# Patient Record
Sex: FEMALE | Race: WHITE | NOT HISPANIC OR LATINO | Employment: UNEMPLOYED
[De-identification: names, ages, dates, MRNs, and addresses within clinical notes are randomized per-mention and may not be internally consistent; named-entity substitution may affect disease eponyms.]

---

## 2018-07-28 ENCOUNTER — HEALTH MAINTENANCE LETTER (OUTPATIENT)
Age: 53
End: 2018-07-28

## 2019-09-11 ENCOUNTER — TELEPHONE (OUTPATIENT)
Dept: OBGYN | Facility: CLINIC | Age: 54
End: 2019-09-11

## 2019-09-11 DIAGNOSIS — Z13.820 SPECIAL SCREENING FOR OSTEOPOROSIS: Primary | ICD-10-CM

## 2019-09-11 DIAGNOSIS — Z12.31 ENCOUNTER FOR SCREENING MAMMOGRAM FOR MALIGNANT NEOPLASM OF BREAST: ICD-10-CM

## 2019-09-11 NOTE — TELEPHONE ENCOUNTER
Reason for call:  Order   Order or referral being requested: mammogram and dexa  Reason for request: orders in her chart have   Date needed: before my next appointment  Has the patient been seen by the PCP for this problem? Not Applicable    Additional comments: NA    Phone number to reach patient:  Cell number on file:    Telephone Information:   Mobile [phone]       Best Time:  NA    Can we leave a detailed message on this number?  Not Applicable     Gloria REYES  Central Scheduler

## 2019-09-29 ENCOUNTER — HEALTH MAINTENANCE LETTER (OUTPATIENT)
Age: 54
End: 2019-09-29

## 2021-01-14 ENCOUNTER — HEALTH MAINTENANCE LETTER (OUTPATIENT)
Age: 56
End: 2021-01-14

## 2021-10-24 ENCOUNTER — HEALTH MAINTENANCE LETTER (OUTPATIENT)
Age: 56
End: 2021-10-24

## 2021-12-09 NOTE — PROGRESS NOTES
Indu is a 56 year old  female who presents for annual exam.     Besides routine health maintenance, she has no other health concerns today .    HPI:  The patient's PCP is Van Maravilla MD. Patient here today for her annual GYN exam and Pap smear.she is also having her mammogram today.  She is postmenopausal and has no GYN complaints at this time.  She is due for DEXA scan.  She has been under a considerable amount of stress with a 93-year-old mother who recently had a massive stroke.    Her blood pressure was very high today and she denies any vision changes or headaches.  She does have a family history on her father side of hypertension.  She has been trying to control this with diet and exercise over the last couple of years but has been unsuccessful.  She does have a blood pressure cuff at home.      GYNECOLOGIC HISTORY:    No LMP recorded. Patient is postmenopausal.    Her current contraception method is: menopause.  She  reports that she quit smoking about 26 years ago. Her smoking use included cigarettes. She has never used smokeless tobacco.    Patient is sexually active.  STD testing offered?  Declined  Last PHQ-9 score on record =   PHQ-9 SCORE 12/10/2021   PHQ-9 Total Score 1     Last GAD7 score on record =   NATHANIEL-7 SCORE 12/10/2021   Total Score 2     Alcohol Score = 2    HEALTH MAINTENANCE:  Cholesterol:   Recent Labs   Lab Test 14  0000   CHOL 226*   HDL 61   *   TRIG 121   Last Mammo: 2015, Result: Normal, Next Mammo: Today  Pap:   Lab Results   Component Value Date    PAP NIL 2015     Colonoscopy:  2014, Result: Normal, Next Colonoscopy: Will schedule when able.  Dexa:  N/a    Health maintenance updated:  yes    HISTORY:  OB History    Para Term  AB Living   2 2 0 0 0 2   SAB IAB Ectopic Multiple Live Births   0 0 0 0 0      # Outcome Date GA Lbr Patrick/2nd Weight Sex Delivery Anes PTL Lv   2 Para      Vag-Spont      1 Para      Vag-Spont           Patient Active Problem List   Diagnosis     Postmenopausal status     Situational anxiety     Past Surgical History:   Procedure Laterality Date     COLONOSCOPY  2014    Procedure: COLONOSCOPY;  COLONOSCOPY;  Surgeon: Jenni Costa MD;  Location:  GI     exploratory laproscopic surgery[      for fertility reasons, hx of endometriosis     TONSILLECTOMY      age 16, nausea and vomitin post procedure      Social History     Tobacco Use     Smoking status: Former Smoker     Types: Cigarettes     Quit date: 1995     Years since quittin.9     Smokeless tobacco: Never Used   Substance Use Topics     Alcohol use: Yes     Alcohol/week: 4.2 standard drinks     Types: 5 Standard drinks or equivalent per week     Comment: socially      Problem (# of Occurrences) Relation (Name,Age of Onset)    Cancer - colorectal (4) Mother, Maternal Grandmother: , Other (Great Aunt), Other (Great Uncle)    Cerebrovascular Disease (3) Mother, Father, Paternal Grandfather    Coronary Artery Disease (4) Father, Maternal Grandfather, Paternal Grandmother, Paternal Grandfather    Diabetes (1) Maternal Grandmother    Hypertension (2) Mother, Father    Other Cancer (1) Paternal Grandmother    Thyroid Disease (1) Paternal Grandfather            Current Outpatient Medications   Medication Sig     ASPIRIN PO Take 81 mg by mouth     multivitamin, therapeutic with minerals (MULTI-VITAMIN) TABS Take 1 tablet by mouth daily     Omega-3 Fatty Acids (OMEGA-3 FISH OIL PO) Take 1 g by mouth daily     LORazepam (ATIVAN) 0.5 MG tablet Take one half to one tablet by mouth every 8 hours as needed (Patient not taking: Reported on 12/10/2021)     No current facility-administered medications for this visit.     No Known Allergies    Past medical, surgical, social and family histories were reviewed and updated in EPIC.    ROS:   12 point review of systems negative other than symptoms noted below or in the HPI.  No urinary frequency  "or dysuria, bladder or kidney problems    EXAM:  BP (!) 190/110   Pulse 86   Ht 1.581 m (5' 2.25\")   Wt 79.5 kg (175 lb 3.2 oz)   Breastfeeding No   BMI 31.79 kg/m     BMI: Body mass index is 31.79 kg/m .    PHYSICAL EXAM:  Constitutional:   Appearance: Well nourished, well developed, alert, in no acute distress  Neck:  Lymph Nodes:  No lymphadenopathy present    Thyroid:  Gland size normal, nontender, no nodules or masses present  on palpation  Chest:  Respiratory Effort:  Breathing unlabored  Cardiovascular:    Heart: Auscultation:  Regular rate, normal rhythm, no murmurs present  Breasts: Inspection of Breasts:  No lymphadenopathy present., Palpation of Breasts and Axillae:  No masses present on palpation, no breast tenderness., Axillary Lymph Nodes:  No lymphadenopathy present., No nodularity, asymmetry or nipple discharge bilaterally. and Extremely dense bilaterally throughout  Gastrointestinal:   Abdominal Examination:  Abdomen nontender to palpation, tone normal without rigidity or guarding, no masses present, umbilicus without lesions   Liver and Spleen:  No hepatomegaly present, liver nontender to palpation    Hernias:  No hernias present  Lymphatic: Lymph Nodes:  No other lymphadenopathy present  Skin:  General Inspection:  No rashes present, no lesions present, no areas of  discoloration  Neurologic:    Mental Status:  Oriented X3.  Normal strength and tone, sensory exam                grossly normal, mentation intact and speech normal.    Psychiatric:   Mentation appears normal and affect normal/bright.         Pelvic Exam:  External Genitalia:     Normal appearance for age, no discharge present, no tenderness present, no inflammatory lesions present, color normal  Vagina:     Normal vaginal vault without central or paravaginal defects, no discharge present, no inflammatory lesions present, no masses present  Bladder:     Nontender to palpation  Urethra:   Urethral Body:  Urethra palpation normal, " urethra structural support normal   Urethral Meatus:  No erythema or lesions present  Cervix:     Appearance healthy, no lesions present, nontender to palpation, no bleeding present  Uterus:     Uterus: firm, normal sized and nontender, midplane in position.   Adnexa:     No adnexal tenderness present, no adnexal masses present  Perineum:     Perineum within normal limits, no evidence of trauma, no rashes or skin lesions present  Anus:     Anus within normal limits, no hemorrhoids present  Inguinal Lymph Nodes:     No lymphadenopathy present  Pubic Hair:     Normal pubic hair distribution for age  Genitalia and Groin:     No rashes present, no lesions present, no areas of discoloration, no masses present      COUNSELING:   Special attention given to:        Regular exercise       Healthy diet/nutrition       (Frieda)menopause management    BMI: Body mass index is 31.79 kg/m .  Weight management plan: Discussed healthy diet and exercise guidelines    ASSESSMENT:  56 year old female with satisfactory annual exam.    ICD-10-CM    1. Encounter for gynecological examination without abnormal finding  Z01.419 Pap thin layer screen with HPV - recommended age 30 - 65 years   2. Screening for osteoporosis  Z13.820 DX Hip/Pelvis/Spine   3. Screening for metabolic disorder  Z13.228 Comprehensive metabolic panel (BMP + Alb, Alk Phos, ALT, AST, Total. Bili, TP)   4. Hypertension, essential  I10        PLAN:  56-year-old female with a normal postmenopausal GYN exam.  She does have elevated blood pressure today and we have asked her to check with her at home blood pressure a few times next week and report her findings to us.  We also discussed the DASH diet.  If her labs are normal and her blood pressure remains high at home we will start a low-dose antihypertensive.    MARLEY Cruz CNP

## 2021-12-10 ENCOUNTER — ANCILLARY PROCEDURE (OUTPATIENT)
Dept: MAMMOGRAPHY | Facility: CLINIC | Age: 56
End: 2021-12-10
Attending: OBSTETRICS & GYNECOLOGY
Payer: COMMERCIAL

## 2021-12-10 ENCOUNTER — OFFICE VISIT (OUTPATIENT)
Dept: OBGYN | Facility: CLINIC | Age: 56
End: 2021-12-10
Payer: COMMERCIAL

## 2021-12-10 VITALS
SYSTOLIC BLOOD PRESSURE: 190 MMHG | DIASTOLIC BLOOD PRESSURE: 110 MMHG | WEIGHT: 175.2 LBS | BODY MASS INDEX: 32.24 KG/M2 | HEART RATE: 86 BPM | HEIGHT: 62 IN

## 2021-12-10 DIAGNOSIS — Z13.228 SCREENING FOR METABOLIC DISORDER: ICD-10-CM

## 2021-12-10 DIAGNOSIS — Z13.820 SCREENING FOR OSTEOPOROSIS: ICD-10-CM

## 2021-12-10 DIAGNOSIS — Z12.31 VISIT FOR SCREENING MAMMOGRAM: ICD-10-CM

## 2021-12-10 DIAGNOSIS — Z01.419 ENCOUNTER FOR GYNECOLOGICAL EXAMINATION WITHOUT ABNORMAL FINDING: Primary | ICD-10-CM

## 2021-12-10 DIAGNOSIS — I10 HYPERTENSION, ESSENTIAL: ICD-10-CM

## 2021-12-10 PROCEDURE — 77067 SCR MAMMO BI INCL CAD: CPT | Mod: TC | Performed by: RADIOLOGY

## 2021-12-10 PROCEDURE — 99386 PREV VISIT NEW AGE 40-64: CPT | Performed by: NURSE PRACTITIONER

## 2021-12-10 PROCEDURE — 80053 COMPREHEN METABOLIC PANEL: CPT | Performed by: NURSE PRACTITIONER

## 2021-12-10 PROCEDURE — 87624 HPV HI-RISK TYP POOLED RSLT: CPT | Performed by: NURSE PRACTITIONER

## 2021-12-10 PROCEDURE — G0145 SCR C/V CYTO,THINLAYER,RESCR: HCPCS | Performed by: NURSE PRACTITIONER

## 2021-12-10 PROCEDURE — 36415 COLL VENOUS BLD VENIPUNCTURE: CPT | Performed by: NURSE PRACTITIONER

## 2021-12-10 ASSESSMENT — ANXIETY QUESTIONNAIRES
IF YOU CHECKED OFF ANY PROBLEMS ON THIS QUESTIONNAIRE, HOW DIFFICULT HAVE THESE PROBLEMS MADE IT FOR YOU TO DO YOUR WORK, TAKE CARE OF THINGS AT HOME, OR GET ALONG WITH OTHER PEOPLE: SOMEWHAT DIFFICULT
1. FEELING NERVOUS, ANXIOUS, OR ON EDGE: SEVERAL DAYS
3. WORRYING TOO MUCH ABOUT DIFFERENT THINGS: NOT AT ALL
GAD7 TOTAL SCORE: 2
5. BEING SO RESTLESS THAT IT IS HARD TO SIT STILL: NOT AT ALL
2. NOT BEING ABLE TO STOP OR CONTROL WORRYING: NOT AT ALL
6. BECOMING EASILY ANNOYED OR IRRITABLE: SEVERAL DAYS
7. FEELING AFRAID AS IF SOMETHING AWFUL MIGHT HAPPEN: NOT AT ALL

## 2021-12-10 ASSESSMENT — PATIENT HEALTH QUESTIONNAIRE - PHQ9
5. POOR APPETITE OR OVEREATING: NOT AT ALL
SUM OF ALL RESPONSES TO PHQ QUESTIONS 1-9: 1

## 2021-12-10 ASSESSMENT — MIFFLIN-ST. JEOR: SCORE: 1331.92

## 2021-12-11 ASSESSMENT — ANXIETY QUESTIONNAIRES: GAD7 TOTAL SCORE: 2

## 2021-12-12 LAB
ALBUMIN SERPL-MCNC: 4.4 G/DL (ref 3.4–5)
ALP SERPL-CCNC: 72 U/L (ref 40–150)
ALT SERPL W P-5'-P-CCNC: 47 U/L (ref 0–50)
ANION GAP SERPL CALCULATED.3IONS-SCNC: 5 MMOL/L (ref 3–14)
AST SERPL W P-5'-P-CCNC: 25 U/L (ref 0–45)
BILIRUB SERPL-MCNC: 0.4 MG/DL (ref 0.2–1.3)
BUN SERPL-MCNC: 23 MG/DL (ref 7–30)
CALCIUM SERPL-MCNC: 9.9 MG/DL (ref 8.5–10.1)
CHLORIDE BLD-SCNC: 104 MMOL/L (ref 94–109)
CO2 SERPL-SCNC: 29 MMOL/L (ref 20–32)
CREAT SERPL-MCNC: 0.63 MG/DL (ref 0.52–1.04)
GFR SERPL CREATININE-BSD FRML MDRD: >90 ML/MIN/1.73M2
GLUCOSE BLD-MCNC: 82 MG/DL (ref 70–99)
POTASSIUM BLD-SCNC: 4.5 MMOL/L (ref 3.4–5.3)
PROT SERPL-MCNC: 8.1 G/DL (ref 6.8–8.8)
SODIUM SERPL-SCNC: 138 MMOL/L (ref 133–144)

## 2021-12-15 LAB
BKR LAB AP GYN ADEQUACY: NORMAL
BKR LAB AP GYN INTERPRETATION: NORMAL
BKR LAB AP HPV REFLEX: NORMAL
BKR LAB AP PREVIOUS ABNORMAL: NORMAL
PATH REPORT.COMMENTS IMP SPEC: NORMAL
PATH REPORT.COMMENTS IMP SPEC: NORMAL
PATH REPORT.RELEVANT HX SPEC: NORMAL

## 2021-12-17 LAB
HUMAN PAPILLOMA VIRUS 16 DNA: NEGATIVE
HUMAN PAPILLOMA VIRUS 18 DNA: NEGATIVE
HUMAN PAPILLOMA VIRUS FINAL DIAGNOSIS: NORMAL
HUMAN PAPILLOMA VIRUS OTHER HR: NEGATIVE

## 2022-05-02 ENCOUNTER — HOSPITAL ENCOUNTER (OUTPATIENT)
Dept: BONE DENSITY | Facility: CLINIC | Age: 57
Discharge: HOME | End: 2022-05-02
Attending: NURSE PRACTITIONER | Admitting: NURSE PRACTITIONER
Payer: COMMERCIAL

## 2022-05-02 DIAGNOSIS — Z13.820 SCREENING FOR OSTEOPOROSIS: ICD-10-CM

## 2022-05-02 PROCEDURE — 77080 DXA BONE DENSITY AXIAL: CPT

## 2022-10-15 ENCOUNTER — HEALTH MAINTENANCE LETTER (OUTPATIENT)
Age: 57
End: 2022-10-15

## 2023-03-26 ENCOUNTER — HEALTH MAINTENANCE LETTER (OUTPATIENT)
Age: 58
End: 2023-03-26

## 2023-07-06 ENCOUNTER — HOSPITAL ENCOUNTER (OUTPATIENT)
Dept: MAMMOGRAPHY | Facility: CLINIC | Age: 58
Discharge: HOME | End: 2023-07-06
Attending: OBSTETRICS & GYNECOLOGY | Admitting: OBSTETRICS & GYNECOLOGY
Payer: COMMERCIAL

## 2023-07-06 DIAGNOSIS — Z12.31 VISIT FOR SCREENING MAMMOGRAM: ICD-10-CM

## 2023-07-06 PROCEDURE — 77067 SCR MAMMO BI INCL CAD: CPT

## 2023-08-29 ENCOUNTER — OFFICE VISIT (OUTPATIENT)
Dept: OBGYN | Facility: CLINIC | Age: 58
End: 2023-08-29
Payer: COMMERCIAL

## 2023-08-29 VITALS
DIASTOLIC BLOOD PRESSURE: 64 MMHG | WEIGHT: 180 LBS | BODY MASS INDEX: 31.89 KG/M2 | SYSTOLIC BLOOD PRESSURE: 120 MMHG | HEIGHT: 63 IN

## 2023-08-29 DIAGNOSIS — Z01.419 ENCNTR FOR GYN EXAM (GENERAL) (ROUTINE) W/O ABN FINDINGS: Primary | ICD-10-CM

## 2023-08-29 DIAGNOSIS — Z13.820 SPECIAL SCREENING FOR OSTEOPOROSIS: ICD-10-CM

## 2023-08-29 PROCEDURE — 99396 PREV VISIT EST AGE 40-64: CPT | Performed by: NURSE PRACTITIONER

## 2023-08-29 RX ORDER — EPINEPHRINE 0.3 MG/.3ML
0.3 INJECTION SUBCUTANEOUS
COMMUNITY
Start: 2022-07-27

## 2023-08-29 ASSESSMENT — PATIENT HEALTH QUESTIONNAIRE - PHQ9
5. POOR APPETITE OR OVEREATING: NOT AT ALL
SUM OF ALL RESPONSES TO PHQ QUESTIONS 1-9: 1

## 2023-08-29 ASSESSMENT — ANXIETY QUESTIONNAIRES
IF YOU CHECKED OFF ANY PROBLEMS ON THIS QUESTIONNAIRE, HOW DIFFICULT HAVE THESE PROBLEMS MADE IT FOR YOU TO DO YOUR WORK, TAKE CARE OF THINGS AT HOME, OR GET ALONG WITH OTHER PEOPLE: SOMEWHAT DIFFICULT
3. WORRYING TOO MUCH ABOUT DIFFERENT THINGS: SEVERAL DAYS
1. FEELING NERVOUS, ANXIOUS, OR ON EDGE: SEVERAL DAYS
GAD7 TOTAL SCORE: 4
7. FEELING AFRAID AS IF SOMETHING AWFUL MIGHT HAPPEN: SEVERAL DAYS
6. BECOMING EASILY ANNOYED OR IRRITABLE: NOT AT ALL
GAD7 TOTAL SCORE: 4
5. BEING SO RESTLESS THAT IT IS HARD TO SIT STILL: NOT AT ALL
2. NOT BEING ABLE TO STOP OR CONTROL WORRYING: SEVERAL DAYS

## 2023-08-29 NOTE — PROGRESS NOTES
Indu is a 58 year old  female who presents for annual exam.     Besides routine health maintenance, she has no other health concerns today .    HPI:  The patient's PCP is  Van Maravilla MD. patient here today for her annual well woman exam.  She is feeling well with no GYN complaints.  She is postmenopausal and has no vaginal bleeding.  She is on no hormone replacement therapy.    She sees primary care who does all of her blood work for her.  She will be due for a bone scan next year.      GYNECOLOGIC HISTORY:    No LMP recorded. Patient is postmenopausal.    Regular menses? No Patient is postmenopausal.  Menses every 0 days.  Length of menses: 0 days    Her current contraception method is: menopause.  She  reports that she quit smoking about 28 years ago. Her smoking use included cigarettes. She has never used smokeless tobacco.    Patient is sexually active.  STD testing offered?  Declined  Last PHQ-9 score on record =       2023    11:15 AM   PHQ-9 SCORE   PHQ-9 Total Score 1     Last GAD7 score on record =       2023    11:15 AM   NATHANIEL-7 SCORE   Total Score 4     Alcohol Score = 2    HEALTH MAINTENANCE:  Cholesterol   Date Value Ref Range Status   2014 226 (H) <200 mg/dL Final     Last Mammo:  2023 , Result: Normal, Next Mammo: Due 2025  Pap:   Lab Results   Component Value Date    PAP NILM, neg HPV 12/10/2021    PAP NIL, neg HPV 2015      Colonoscopy:  2014, Result: Normal, Next Colonoscopy: 10 years: 2024.       Health maintenance updated:    Care Gaps  Overdue    Never   Done ADVANCE CARE PLANNING (Every 5 Years)       Never   Done HIV SCREENING (Once)       Never   Done HEPATITIS C SCREENING (Once)       Never   Done ZOSTER IMMUNIZATION (1 of 2)       Never   Done LUNG CANCER SCREENING (Yearly)       2017 DTAP/TDAP/TD IMMUNIZATION (1 - Tdap)  Last completed:  LIPID (Every 5 Years)  Last completed: 2014     DEC 24     COVID-19 Vaccine (4 - Moderna series)  Last completed: Oct 29, 2021     DEC 10   2022 YEARLY PREVENTIVE VISIT (Yearly)  Last completed: Dec 10, 2021     MATEO 1   2023 PHQ-2 (once per calendar year) (Yearly, January to December)  Last completed: Dec 10, 2021   Due Soon    SEP 1   2023 INFLUENZA VACCINE (1)  Last completed: Oct 31, 2022   Upcoming    2024 COLORECTAL CANCER SCREENING (COLONOSCOPY - Preferred) (Every 10 Years)  Last completed:  MAMMO SCREENING (Every 2 Years)  Last completed: 2023     DEC 10   2026 HPV TEST (Once)  Last completed: Dec 10, 2021     DEC 10   2026 PAP (Every 5 Years)  Last completed: Dec 10, 2021     HISTORY:  OB History    Para Term  AB Living   2 2 0 0 0 2   SAB IAB Ectopic Multiple Live Births   0 0 0 0 0      # Outcome Date GA Lbr Patrick/2nd Weight Sex Delivery Anes PTL Lv   2 Para      Vag-Spont      1 Para      Vag-Spont          Patient Active Problem List   Diagnosis    Postmenopausal status    Situational anxiety     Past Surgical History:   Procedure Laterality Date    COLONOSCOPY  2014    Procedure: COLONOSCOPY;  COLONOSCOPY;  Surgeon: Jenni Costa MD;  Location:  GI    exploratory laproscopic surgery[      for fertility reasons, hx of endometriosis    TONSILLECTOMY      age 16, nausea and vomitin post procedure      Social History     Tobacco Use    Smoking status: Former     Types: Cigarettes     Quit date: 1995     Years since quittin.6    Smokeless tobacco: Never   Substance Use Topics    Alcohol use: Yes     Alcohol/week: 4.2 standard drinks of alcohol     Types: 5 Standard drinks or equivalent per week     Comment: socially      Problem (# of Occurrences) Relation (Name,Age of Onset)    Diabetes (1) Maternal Grandmother    Hypertension (2) Mother, Father    Cerebrovascular Disease (3) Mother, Father, Paternal Grandfather    Thyroid Disease (1) Paternal Grandfather    Cancer - colorectal (4)  "Mother, Maternal Grandmother: , Other (Great Aunt), Other (Great Uncle)    Other Cancer (1) Paternal Grandmother    Coronary Artery Disease (4) Father, Maternal Grandfather, Paternal Grandmother, Paternal Grandfather              Current Outpatient Medications   Medication Sig    ASPIRIN PO Take 81 mg by mouth    EPINEPHrine (ANY BX GENERIC EQUIV) 0.3 MG/0.3ML injection 2-pack Inject 0.3 mg into the muscle    multivitamin, therapeutic with minerals (MULTI-VITAMIN) TABS Take 1 tablet by mouth daily    Omega-3 Fatty Acids (OMEGA-3 FISH OIL PO) Take 1 g by mouth daily     No current facility-administered medications for this visit.     No Known Allergies    Past medical, surgical, social and family histories were reviewed and updated in EPIC.    ROS:   12 point review of systems negative other than symptoms noted below or in the HPI.  No urinary frequency or dysuria, bladder or kidney problems    EXAM:  /64   Ht 1.6 m (5' 3\")   Wt 81.6 kg (180 lb)   BMI 31.89 kg/m     BMI: Body mass index is 31.89 kg/m .    PHYSICAL EXAM:  Constitutional:   Appearance: Well nourished, well developed, alert, in no acute distress  Breasts: Inspection of Breasts:  No lymphadenopathy present., Palpation of Breasts and Axillae:  No masses present on palpation, no breast tenderness., Axillary Lymph Nodes:  No lymphadenopathy present., and No nodularity, asymmetry or nipple discharge bilaterally.  Skin:  General Inspection:  No rashes present, no lesions present, no areas of  discoloration  Neurologic:    Mental Status:  Oriented X3.  Normal strength and tone, sensory exam                grossly normal, mentation intact and speech normal.    Psychiatric:   Mentation appears normal and affect normal/bright.         Pelvic Exam:  External Genitalia:     Normal appearance for age, no discharge present, no tenderness present, no inflammatory lesions present, color normal  Vagina:     Normal vaginal vault without central or " paravaginal defects, no discharge present, no inflammatory lesions present, no masses present  Bladder:     Nontender to palpation  Urethra:   Urethral Body:  Urethra palpation normal, urethra structural support normal   Urethral Meatus:  No erythema or lesions present  Cervix:     Appearance healthy, no lesions present, nontender to palpation, no bleeding present  Uterus:     Uterus: firm, normal sized and nontender, anteverted in position.   Adnexa:     No adnexal tenderness present, no adnexal masses present  Perineum:     Perineum within normal limits, no evidence of trauma, no rashes or skin lesions present  Anus:     Anus within normal limits, no hemorrhoids present  Inguinal Lymph Nodes:     No lymphadenopathy present  Pubic Hair:     Normal pubic hair distribution for age  Genitalia and Groin:     No rashes present, no lesions present, no areas of discoloration, no masses present    COUNSELING:   Special attention given to:        Regular exercise       Healthy diet/nutrition       Osteoporosis prevention/bone health       Colorectal Cancer Screening    BMI: Body mass index is 31.89 kg/m .  Weight management plan: Discussed healthy diet and exercise guidelines    ASSESSMENT:  58 year old female with satisfactory annual exam.    ICD-10-CM    1. Encntr for gyn exam (general) (routine) w/o abn findings  Z01.419       2. Special screening for osteoporosis  Z13.820 DX Hip/Pelvis/Spine          PLAN:  58-year-old female with a normal GYN exam.  Colonoscopy will be due next spring.  Pap smear is up-to-date.  She is to continue with her annual mammogram.  DEXA will be due next year.    MARLEY Cruz CNP

## 2024-10-13 ENCOUNTER — HEALTH MAINTENANCE LETTER (OUTPATIENT)
Age: 59
End: 2024-10-13

## 2025-07-30 ENCOUNTER — OFFICE VISIT (OUTPATIENT)
Dept: OBGYN | Facility: CLINIC | Age: 60
End: 2025-07-30
Payer: COMMERCIAL

## 2025-07-30 ENCOUNTER — ANCILLARY PROCEDURE (OUTPATIENT)
Dept: MAMMOGRAPHY | Facility: CLINIC | Age: 60
End: 2025-07-30
Payer: COMMERCIAL

## 2025-07-30 VITALS
DIASTOLIC BLOOD PRESSURE: 76 MMHG | SYSTOLIC BLOOD PRESSURE: 134 MMHG | HEIGHT: 62 IN | WEIGHT: 180 LBS | BODY MASS INDEX: 33.13 KG/M2

## 2025-07-30 DIAGNOSIS — Z12.31 VISIT FOR SCREENING MAMMOGRAM: ICD-10-CM

## 2025-07-30 DIAGNOSIS — Z12.4 SCREENING FOR CERVICAL CANCER: ICD-10-CM

## 2025-07-30 DIAGNOSIS — Z13.820 SPECIAL SCREENING FOR OSTEOPOROSIS: ICD-10-CM

## 2025-07-30 DIAGNOSIS — Z71.84 TRAVEL ADVICE ENCOUNTER: ICD-10-CM

## 2025-07-30 DIAGNOSIS — Z01.419 ENCOUNTER FOR GYNECOLOGICAL EXAMINATION WITHOUT ABNORMAL FINDING: Primary | ICD-10-CM

## 2025-07-30 DIAGNOSIS — Z12.11 SCREEN FOR COLON CANCER: ICD-10-CM

## 2025-07-30 PROCEDURE — 3075F SYST BP GE 130 - 139MM HG: CPT | Performed by: NURSE PRACTITIONER

## 2025-07-30 PROCEDURE — G0101 CA SCREEN;PELVIC/BREAST EXAM: HCPCS | Performed by: NURSE PRACTITIONER

## 2025-07-30 PROCEDURE — 77063 BREAST TOMOSYNTHESIS BI: CPT | Mod: TC | Performed by: RADIOLOGY

## 2025-07-30 PROCEDURE — 99459 PELVIC EXAMINATION: CPT | Performed by: NURSE PRACTITIONER

## 2025-07-30 PROCEDURE — 99396 PREV VISIT EST AGE 40-64: CPT | Performed by: NURSE PRACTITIONER

## 2025-07-30 PROCEDURE — 3078F DIAST BP <80 MM HG: CPT | Performed by: NURSE PRACTITIONER

## 2025-07-30 PROCEDURE — 77067 SCR MAMMO BI INCL CAD: CPT | Mod: TC | Performed by: RADIOLOGY

## 2025-07-30 PROCEDURE — 87624 HPV HI-RISK TYP POOLED RSLT: CPT | Performed by: NURSE PRACTITIONER

## 2025-07-30 RX ORDER — FLUTICASONE PROPIONATE 50 MCG
1 SPRAY, SUSPENSION (ML) NASAL
COMMUNITY

## 2025-07-30 RX ORDER — AMLODIPINE BESYLATE 2.5 MG/1
2.5 TABLET ORAL DAILY
COMMUNITY
Start: 2023-09-02

## 2025-07-30 RX ORDER — PROPRANOLOL HCL 20 MG
20 TABLET ORAL 2 TIMES DAILY
COMMUNITY
Start: 2023-08-30

## 2025-07-30 RX ORDER — SCOPOLAMINE 1 MG/3D
1 PATCH, EXTENDED RELEASE TRANSDERMAL
COMMUNITY
Start: 2023-10-09 | End: 2025-07-30

## 2025-07-30 RX ORDER — SCOPOLAMINE 1 MG/3D
1 PATCH, EXTENDED RELEASE TRANSDERMAL
Qty: 10 PATCH | Refills: 1 | Status: SHIPPED | OUTPATIENT
Start: 2025-07-30

## 2025-07-30 RX ORDER — LOSARTAN POTASSIUM AND HYDROCHLOROTHIAZIDE 12.5; 5 MG/1; MG/1
0.5 TABLET ORAL DAILY
COMMUNITY
Start: 2024-04-10

## 2025-07-30 RX ORDER — ROSUVASTATIN CALCIUM 5 MG/1
5 TABLET, COATED ORAL DAILY
COMMUNITY
Start: 2024-04-10

## 2025-07-30 RX ORDER — METOPROLOL SUCCINATE 25 MG/1
50 TABLET, EXTENDED RELEASE ORAL DAILY
COMMUNITY
Start: 2024-04-05

## 2025-07-30 NOTE — PROGRESS NOTES
"Indu is a 60 year old  female who presents for annual exam.     Besides routine health maintenance, she has no other health concerns today .    HPI:  The patient's PCP is Martina Bryan. Indu is here today for her annual well woman exam. She has no GYN concerns today.     She denies vaginal bleeding, bladder symptoms or dyspareunia. Her mood is stable and she is enjoying \"semi\" [facility]. She is still writing but only when she wants to. She leaves for [place] next month for 5 weeks. She is requesting a refill on her scope patches.     GYNECOLOGIC HISTORY:    No LMP recorded. Patient is postmenopausal.        Her current contraception method is: menopause.  She  reports that she quit smoking about 30 years ago. Her smoking use included cigarettes. She has never used smokeless tobacco.    Patient is sexually active.  STD testing offered?  Declined  Last PHQ-9 score on record =       2023    11:15 AM   PHQ-9 SCORE   PHQ-9 Total Score 1     Last GAD7 score on record =       2023    11:15 AM   NATHANIEL-7 SCORE   Total Score 4     HEALTH MAINTENANCE:  Cholesterol: (  Cholesterol   Date Value Ref Range Status   2014 226 (H) <200 mg/dL Final       Pap:   Lab Results   Component Value Date    GYNINTERP  12/10/2021     Negative for Intraepithelial Lesion or Malignancy (NILM)    PAP NIL 2015       Health maintenance updated:  yes    Care Gaps    Overdue     2017 DTAP/TDAP/TD VACCINE (1 - Tdap)  Last completed:  LIPID (Every 5 Years)  Last completed:  COLORECTAL CANCER SCREENING (COLONOSCOPY) (Every 10 Years)  Last completed: 2014   AUG 29  2024 YEARLY PREVENTIVE VISIT (Yearly)  Last completed: Aug 29, 2023   SEP 1  2024 COVID-19 VACCINE ( season)  Last completed: Oct 29, 2021   DEC 10  2024 DIABETES SCREENING (Every 3 Years)  Last completed: Dec 10, 2021   Never done ADVANCE CARE PLANNING (Every 5 Years)   Never done HIV SCREENING " (Once)   Never done PNEUMOCOCCAL VACCINE 50+ YEARS (1 of 1 - PCV)   Never done ZOSTER VACCINE (1 of 2)   2013 LUNG CANCER SCREENING       Scheduled For     2025 MAMMO SCREENING (Every 2 Years)  Order Details      Upcoming     SEP 1  2025 INFLUENZA VACCINE (1)  Last completed: Oct 9, 2023   DEC 10  2026 HPV TEST (Once)  Last completed: Dec 10, 2021   DEC 10  2026 PAP (Every 5 Years)  Last completed: Dec 10, 2021   FEB 21  2038 RSV VACCINE (1 - 1-dose 75+ series)       HISTORY:  OB History    Para Term  AB Living   2 2 0 0 0 2   SAB IAB Ectopic Multiple Live Births   0 0 0 0 0      # Outcome Date GA Lbr Patrick/2nd Weight Sex Type Anes PTL Lv   2 Para      Vag-Spont      1 Para      Vag-Spont          Patient Active Problem List   Diagnosis    Postmenopausal status    Situational anxiety     Past Surgical History:   Procedure Laterality Date    COLONOSCOPY  2014    Procedure: COLONOSCOPY;  COLONOSCOPY;  Surgeon: Jenni Costa MD;  Location:  GI    exploratory laproscopic surgery[      for fertility reasons, hx of endometriosis    TONSILLECTOMY      age 16, nausea and vomitin post procedure      Social History     Tobacco Use    Smoking status: Former     Current packs/day: 0.00     Types: Cigarettes     Quit date: 1995     Years since quittin.5    Smokeless tobacco: Never   Substance Use Topics    Alcohol use: Yes     Alcohol/week: 4.2 standard drinks of alcohol     Types: 5 Standard drinks or equivalent per week     Comment: socially      Problem (# of Occurrences) Relation (Name,Age of Onset)    Diabetes (1) Maternal Grandmother    Hypertension (2) Mother, Father    Cerebrovascular Disease (3) Mother, Father, Paternal Grandfather    Thyroid Disease (1) Paternal Grandfather    Cancer - colorectal (4) Mother, Maternal Grandmother: , Other (Great Aunt), Other (Great Uncle)    Other Cancer (1) Paternal Grandmother    Coronary Artery Disease (4) Father, Maternal  "Grandfather, Paternal Grandmother, Paternal Grandfather              Current Outpatient Medications   Medication Sig Dispense Refill    amLODIPine (NORVASC) 2.5 MG tablet Take 2.5 mg by mouth daily.      EPINEPHrine (ANY BX GENERIC EQUIV) 0.3 MG/0.3ML injection 2-pack Inject 0.3 mg into the muscle      fluticasone (FLONASE) 50 MCG/ACT nasal spray Spray 1 spray in nostril.      losartan-hydrochlorothiazide (HYZAAR) 50-12.5 MG tablet Take 0.5 tablets by mouth daily.      metoprolol succinate ER (TOPROL XL) 25 MG 24 hr tablet Take 50 mg by mouth daily.      multivitamin, therapeutic with minerals (MULTI-VITAMIN) TABS Take 1 tablet by mouth daily      Omega-3 Fatty Acids (OMEGA-3 FISH OIL PO) Take 1 g by mouth daily      propranolol (INDERAL) 20 MG tablet Take 20 mg by mouth 2 times daily.      rosuvastatin (CRESTOR) 5 MG tablet Take 5 mg by mouth daily.      scopolamine (TRANSDERM) 1 MG/3DAYS 72 hr patch Place 1 patch onto the skin every 72 hours. 10 patch 1     No current facility-administered medications for this visit.     Allergies   Allergen Reactions    Bee Venom Anaphylaxis    Insect Extract Swelling       Past medical, surgical, social and family histories were reviewed and updated in EPIC.    ROS:   12 point review of systems negative other than symptoms noted below or in the HPI.  No urinary frequency or dysuria, bladder or kidney problems    EXAM:  /76   Ht 1.58 m (5' 2.21\")   Wt 81.6 kg (180 lb)   BMI 32.71 kg/m     BMI: Body mass index is 32.71 kg/m .    PHYSICAL EXAM:  Constitutional:   Appearance: Well nourished, well developed, alert, in no acute distress  Breasts: Palpation of Breasts and Axillae:  No masses present on palpation, no breast tenderness.  Skin:  General Inspection:  No rashes present, no lesions present, no areas of  discoloration  Neurologic:    Mental Status:  Oriented X3.  Normal strength and tone, sensory exam                grossly normal, mentation intact and speech normal. "    Psychiatric:   Mentation appears normal and affect normal/bright.         Pelvic Exam:  External Genitalia:     Normal appearance for age, no discharge present, no tenderness present, no inflammatory lesions present, color normal  Vagina:     Normal vaginal vault without central or paravaginal defects, ATROPHIC  Bladder:     Nontender to palpation  Urethra:   Urethral Body:  Urethra palpation normal, urethra structural support normal   Urethral Meatus:  No erythema or lesions present  Cervix:     Appearance healthy, no lesions present, nontender to palpation, no bleeding present  Uterus:     Nontender to palpation, no masses present, position anteflexed, mobility: normal  Adnexa:     No adnexal tenderness present, no adnexal masses present  Perineum:     Perineum within normal limits, no evidence of trauma, no rashes or skin lesions present  Inguinal Lymph Nodes:     No lymphadenopathy present    COUNSELING:   Reviewed preventive health counseling, as reflected in patient instructions       Regular exercise       Healthy diet/nutrition       (Frieda)menopause management    BMI: Body mass index is 32.71 kg/m .  Weight management plan: Discussed healthy diet and exercise guidelines    ASSESSMENT:  60 year old female with satisfactory annual exam.    ICD-10-CM    1. Encounter for gynecological examination without abnormal finding  Z01.419       2. Screen for colon cancer  Z12.11 Colonoscopy Screening  Referral      3. Travel advice encounter  Z71.84 scopolamine (TRANSDERM) 1 MG/3DAYS 72 hr patch      4. Screening for cervical cancer  Z12.4 HPV and Gynecologic Cytology Panel - Recommended Age 30-65 Years      5. Special screening for osteoporosis  Z13.820 DX Bone Density          PLAN:  60 year old female here today for her annual well woman visit. Her GYN exam was normal. She was reminded she is due for a colonoscopy and DEXA. She did do her mammo today. Pap was collected, if normal can repeat in 3 years.  Scope patches refilled for her vacation. Follow up in 1 year for annual exam.    Analisa [employer] student    I was present with the student who participated in the service and in the documentation of the note. I have verified the history and personally performed the physical exam and medical decision-making. I agree with the assessment and plan of care as documented in the note.     MARLEY Cruz CNP

## 2025-07-31 LAB
HPV HR 12 DNA CVX QL NAA+PROBE: NEGATIVE
HPV16 DNA CVX QL NAA+PROBE: NEGATIVE
HPV18 DNA CVX QL NAA+PROBE: NEGATIVE
HUMAN PAPILLOMA VIRUS FINAL DIAGNOSIS: NORMAL

## 2025-08-04 ENCOUNTER — TELEPHONE (OUTPATIENT)
Dept: GASTROENTEROLOGY | Facility: CLINIC | Age: 60
End: 2025-08-04
Payer: COMMERCIAL

## 2025-08-04 LAB
BKR AP ASSOCIATED HPV REPORT: NORMAL
BKR LAB AP GYN ADEQUACY: NORMAL
BKR LAB AP GYN INTERPRETATION: NORMAL
BKR LAB AP PREVIOUS ABNORMAL: NORMAL
PATH REPORT.COMMENTS IMP SPEC: NORMAL
PATH REPORT.COMMENTS IMP SPEC: NORMAL
PATH REPORT.RELEVANT HX SPEC: NORMAL